# Patient Record
Sex: MALE | Race: WHITE | NOT HISPANIC OR LATINO | Employment: OTHER | URBAN - METROPOLITAN AREA
[De-identification: names, ages, dates, MRNs, and addresses within clinical notes are randomized per-mention and may not be internally consistent; named-entity substitution may affect disease eponyms.]

---

## 2019-06-24 ENCOUNTER — TELEPHONE (OUTPATIENT)
Dept: CARDIOLOGY | Facility: CLINIC | Age: 74
End: 2019-06-24

## 2019-06-24 NOTE — TELEPHONE ENCOUNTER
Patient called states is unable to tolerate Statin - severe leg and arm pain noted. He noted improvement when Pravastatin was stopped - Prvastatin 80 mg .

## 2019-11-05 ENCOUNTER — OFFICE VISIT (OUTPATIENT)
Dept: CARDIOLOGY | Facility: CLINIC | Age: 74
End: 2019-11-05

## 2019-11-05 VITALS
DIASTOLIC BLOOD PRESSURE: 76 MMHG | OXYGEN SATURATION: 98 % | SYSTOLIC BLOOD PRESSURE: 163 MMHG | BODY MASS INDEX: 33.59 KG/M2 | WEIGHT: 226.8 LBS | HEART RATE: 66 BPM | HEIGHT: 69 IN

## 2019-11-05 DIAGNOSIS — E78.5 DYSLIPIDEMIA: ICD-10-CM

## 2019-11-05 DIAGNOSIS — I10 ESSENTIAL HYPERTENSION: Primary | ICD-10-CM

## 2019-11-05 PROCEDURE — 99213 OFFICE O/P EST LOW 20 MIN: CPT | Performed by: INTERNAL MEDICINE

## 2019-11-05 PROCEDURE — 93000 ELECTROCARDIOGRAM COMPLETE: CPT | Performed by: INTERNAL MEDICINE

## 2019-11-05 RX ORDER — PRAVASTATIN SODIUM 80 MG/1
80 TABLET ORAL DAILY
COMMUNITY
End: 2020-04-13

## 2019-11-05 RX ORDER — GABAPENTIN 300 MG/1
300 CAPSULE ORAL 3 TIMES DAILY
Refills: 2 | COMMUNITY
Start: 2019-10-19 | End: 2021-07-01

## 2019-11-05 RX ORDER — INDOMETHACIN 25 MG/1
CAPSULE ORAL 2 TIMES DAILY PRN
COMMUNITY
Start: 2012-06-13

## 2019-11-05 RX ORDER — HYDRALAZINE HYDROCHLORIDE 25 MG/1
TABLET, FILM COATED ORAL 3 TIMES DAILY
COMMUNITY
Start: 2019-04-03 | End: 2020-04-13 | Stop reason: SDUPTHER

## 2019-11-05 RX ORDER — VITAMIN E 268 MG
CAPSULE ORAL DAILY
COMMUNITY
Start: 2015-08-19 | End: 2021-07-01

## 2019-11-05 RX ORDER — AMOXICILLIN 500 MG
CAPSULE ORAL
COMMUNITY
Start: 2012-06-13

## 2019-11-05 RX ORDER — CITALOPRAM 20 MG/1
20 TABLET ORAL DAILY
COMMUNITY
Start: 2016-08-16

## 2019-11-05 RX ORDER — MONTELUKAST SODIUM 10 MG/1
TABLET ORAL DAILY
COMMUNITY
Start: 2019-11-04 | End: 2021-07-01

## 2019-11-05 RX ORDER — HYDROCHLOROTHIAZIDE 12.5 MG/1
12.5 TABLET ORAL DAILY
COMMUNITY
End: 2020-04-13 | Stop reason: SDUPTHER

## 2019-11-05 RX ORDER — ALLOPURINOL 300 MG/1
300 TABLET ORAL DAILY
Refills: 4 | COMMUNITY
Start: 2019-08-12

## 2019-11-05 RX ORDER — MELOXICAM 15 MG/1
TABLET ORAL DAILY PRN
COMMUNITY
Start: 2013-01-22 | End: 2021-07-01

## 2019-11-05 RX ORDER — BIOTIN 5 MG
TABLET ORAL 2 TIMES DAILY
COMMUNITY
Start: 2012-06-13 | End: 2022-12-13

## 2019-11-05 RX ORDER — METOPROLOL TARTRATE 50 MG/1
TABLET, FILM COATED ORAL EVERY 12 HOURS
COMMUNITY
Start: 2017-12-16 | End: 2020-04-13 | Stop reason: SDUPTHER

## 2019-11-05 NOTE — PROGRESS NOTES
"History of Present Illness:  Followup 6 months   In hospital for pneumonia very ill - 2018        CC:  Recent admission in the hospital for pneumonia,  uncontrolled Hypertension, Dyslipidemia, COPD, statin intolerance     Mr. Xu Shook is a very pleasant  74 years old gentleman with history of hypertension and dyslipidemia. He had normal coronary arteries by cardiac catheterization in August of 2008.       .  His last lipid profile today showed total cholesterol 176  HDL 35 triglycerides 175.  He is currently on pravastatin 80 mg daily.  His lipids today showed total cholesterol of 172 triglycerides 180 HDL of 34 LDL of 102    /76   Pulse 66 Comment: Sinus  Ht 175.3 cm (69\")   Wt 103 kg (226 lb 12.8 oz)   SpO2 98%   BMI 33.49 kg/m²      Indication for EKG: Abnormal EKG, hypertension    Normal sinus rhythm with a rate of 66 bpm LVH with secondary repolarization abnormality similar to previous EKG of       EKG showed sinus bradycardia and LVH with secondary repolarization abnormalities siimilar to previous EKG 4/3/2019.  AL interval 150 ms QRS duration 99 ms  ms and QRS axis of 70.      We will see him in the office in 6 months for Follow-up.  He has myalgias secondary to pravastatin and it helped when he stopped it but he is resume taking it.  I have suggested that he cut down the pravastatin to 40 mg daily and watch his diet somewhat more strictly.  A/P     1   hypertension probably whitecoat since it is doing much better at home.  We will continue his hydralazine at 25 mg t.i.d. instead of b.i.d.     2- Dyslipidemia.  Lipid profile is as noted above.    Will see him in the office in 6 months for follow-up     .    Thank you very much for allowing us to participate in the patients                   Past Medical History:     Reviewed history from 08/24/2018 and no changes required:        Hypertension        Dyslipidemia        Normal Cardiac Cath, Aug. 2008        Sleep Apnea         Gout " "        \"Short of glaucoma\"         [facility]     Past Surgical History:     Reviewed history from 2018 and no changes required:        Cardiac Cath, Aug. 2008        Cholecystectomy        Knee Arthroscopy        Surgery Left arm : 2018     Active Medications (reviewed today):  ALBUTEROL SULFATE (2.5 MG/3ML) 0.083% INHALATION NEBULIZATION SOLUTION (ALBUTEROL SULFATE) SVN BID  PERCOCET TABLET (OXYCODONE-ACETAMINOPHEN TABS) PRN  CELEXA 20 MG ORAL TABLET (CITALOPRAM HYDROBROMIDE) Take 1 tablet by mouth daily  PRAVASTATIN 80MG    TAB (PRAVASTATIN SODIUM) TAKE 1/2 TABLET BY MOUTH ONCE DAILY  VITAMIN E 400 UNIT ORAL CAPSULE (VITAMIN E) Take 1 tablet by mouth daily  HYDROCHLOROTHIAZIDE 12.5MG CAP (HYDROCHLOROTHIAZIDE) TAKE ONE CAPSULE BY MOUTH ONCE DAILY  MELOXICAM 7.5 MG ORAL TABLET (MELOXICAM) Take 1 tablet by mouth daily prn  HYDRALAZINE 25MG    TAB (HYDRALAZINE HCL) TAKE 1 TABLET BY MOUTH THREE TIMES DAILY  METOPROLOL TART 50MG TAB (METOPROLOL TARTRATE) TAKE ONE TABLET BY MOUTH TWICE DAILY  INDOMETHACIN 25 MG ORAL CAPSULE (INDOMETHACIN) Take one (1) tablet by mouth twice a day prn  ALLOPURINOL 300 MG ORAL TABLET (ALLOPURINOL) Take 1 tablet by mouth daily  ASPIRIN 81 MG ORAL TABLET (ASPIRIN) Take 1 tablet by mouth daily  GLUCOSAMINE CHONDROITIN ADV ORAL TABLET (MISC NATURAL PRODUCTS) Take 1 tablet by mouth twice daily  FISH OIL TRIPLE STRENGTH 1400 MG ORAL CAPSULE (OMEGA-3 FATTY ACIDS) Take two (2) tablet by mouth twice a day     Current Allergies (reviewed today):  PENICILLIN (Severe)     Family History Summary:      Reviewed history Last on 4-3-19 and no changes required:      General Comments - FH:  FH Heart Disease-mother and maternal grandmother  of MI   FH Lung/Respiratory Disease-father   FH Hypertension-mother and brother   FH Diabetes-maternal grandmother         Social History:     Reviewed history from 4-3-19 and no changes required:                2 children and stepson        " Retired        Risk Factors:      Smoked Tobacco Use:  Never smoker  Passive smoke exposure:  no  Drug use:  no  HIV high-risk behavior:  no  Caffeine use:  5+ drinks per day  Alcohol use:  yes     Type:  beer 2 daily  Exercise:  yes     Times per week:  3     Type of Exercise:  yard work   Seatbelt use:  100 %  Sun Exposure:  frequently     Family History Risk Factors:     Family History of MI in females < 65 years old:  yes     Family History of MI in males < 55 years old:  no           Review of Systems      General       Denies fever, chills, sweats, anorexia, fatigue, weakness, malaise, weight loss, weight gain and sleep disorder.     CV       Denies difficulty breathing at night, near fainting, chest pain or discomfort, racing/skipping heart beats, fatigue, lightheadedness, shortness of breath with exertion, palpitations, swelling of hands or feet, difficulty breathing w   Neuro    Lungs no wheezing no shortness of breath.    Gastroenterology.  No constipation diarrhea nausea or vomiting.  No GI bleed.    Neurology negative.      Psych      No abnormalities.        Physical Exam     General:      well developed, well nourished, in no acute distress.    Neck:      no masses, thyromegaly, or abnormal cervical nodes.   no JVD. No carotid bruits  Lungs:      clear bilaterally to auscultation.    Heart:      non-displaced PMI, chest non-tender; regular rate and rhythm, S1, S2 without murmurs, rubs, or gallops    Abdomen soft nontender with no organomegaly masses abnormal pulsations or bruits pulses:      pulses normal in all 4 extremities.    Extremities:       no edema

## 2019-12-04 ENCOUNTER — ON CAMPUS - OUTPATIENT (OUTPATIENT)
Dept: URBAN - METROPOLITAN AREA HOSPITAL 77 | Facility: HOSPITAL | Age: 74
End: 2019-12-04
Payer: COMMERCIAL

## 2019-12-04 DIAGNOSIS — Z86.010 PERSONAL HISTORY OF COLONIC POLYPS: ICD-10-CM

## 2019-12-04 DIAGNOSIS — D12.2 BENIGN NEOPLASM OF ASCENDING COLON: ICD-10-CM

## 2019-12-04 DIAGNOSIS — D12.0 BENIGN NEOPLASM OF CECUM: ICD-10-CM

## 2019-12-04 PROCEDURE — 45385 COLONOSCOPY W/LESION REMOVAL: CPT | Performed by: INTERNAL MEDICINE

## 2020-04-13 ENCOUNTER — OFFICE VISIT (OUTPATIENT)
Dept: CARDIOLOGY | Facility: CLINIC | Age: 75
End: 2020-04-13

## 2020-04-13 VITALS
BODY MASS INDEX: 31.75 KG/M2 | SYSTOLIC BLOOD PRESSURE: 140 MMHG | DIASTOLIC BLOOD PRESSURE: 60 MMHG | WEIGHT: 215 LBS | HEART RATE: 60 BPM

## 2020-04-13 DIAGNOSIS — I10 ESSENTIAL HYPERTENSION: Primary | ICD-10-CM

## 2020-04-13 DIAGNOSIS — J43.9 PULMONARY EMPHYSEMA, UNSPECIFIED EMPHYSEMA TYPE (HCC): ICD-10-CM

## 2020-04-13 DIAGNOSIS — E78.2 MIXED HYPERLIPIDEMIA: ICD-10-CM

## 2020-04-13 PROBLEM — J44.9 COPD (CHRONIC OBSTRUCTIVE PULMONARY DISEASE): Status: ACTIVE | Noted: 2020-04-13

## 2020-04-13 PROBLEM — E78.5 HYPERLIPIDEMIA: Status: ACTIVE | Noted: 2020-04-13

## 2020-04-13 PROCEDURE — 99443 PR PHYS/QHP TELEPHONE EVALUATION 21-30 MIN: CPT | Performed by: INTERNAL MEDICINE

## 2020-04-13 RX ORDER — HYDRALAZINE HYDROCHLORIDE 25 MG/1
25 TABLET, FILM COATED ORAL 3 TIMES DAILY
Qty: 270 TABLET | Refills: 3 | Status: SHIPPED | OUTPATIENT
Start: 2020-04-13 | End: 2020-05-11 | Stop reason: SDUPTHER

## 2020-04-13 RX ORDER — PRAVASTATIN SODIUM 40 MG
40 TABLET ORAL DAILY
Qty: 90 TABLET | Refills: 3 | Status: SHIPPED | OUTPATIENT
Start: 2020-04-13 | End: 2020-07-14

## 2020-04-13 RX ORDER — HYDROCHLOROTHIAZIDE 12.5 MG/1
12.5 TABLET ORAL DAILY
Qty: 90 TABLET | Refills: 3 | Status: SHIPPED | OUTPATIENT
Start: 2020-04-13 | End: 2020-07-14 | Stop reason: SDUPTHER

## 2020-04-13 RX ORDER — METOPROLOL TARTRATE 50 MG/1
50 TABLET, FILM COATED ORAL 2 TIMES DAILY
Qty: 180 TABLET | Refills: 3 | Status: SHIPPED | OUTPATIENT
Start: 2020-04-13 | End: 2020-07-14 | Stop reason: SDUPTHER

## 2020-04-13 NOTE — PROGRESS NOTES
Cardiology Office Visit      Encounter Date:  04/13/2020    Patient ID:   Xu Baeza is a 74 y.o. male.    Reason For Followup:  Hypertension  Hyperlipidemia  Coronary artery disease  Statin intolerance    Brief Clinical History:  Dear Dr. Bonilla, London Broderick MD    I had the pleasure of seeing Xu Baeza today. As you are well aware, this is a 74 y.o. male with history of hypertension and dyslipidemia. He had normal coronary arteries by cardiac catheterization in August of 2008.      Interval History:  Denies any symptoms of chest pain shortness of breath dizziness or syncope    Assessment & Plan    Impressions:  1   hypertension probably whitecoat since it is doing much better at home.  We will continue his hydralazine at 25 mg t.i.d. medications reviewed with the patient.     2- Dyslipidemia.  Lipid profile is as noted above.    3.  Coronary artery disease    4.  Intolerance to statins    Recommendations:  We will see him in the office in 6 months for Follow-up.  He has myalgias secondary to pravastatin and it helped when he stopped it but he is resume taking it.  I have suggested that he cut down the pravastatin to 40 mg daily and watch his diet somewhat more strictly.  Labs discussed with the patient  Regular labs with primary care physician  Will check lipid levels in 3 months on Pravachol 40 mg p.o. once a day and decide on the further course of action  Patient is tolerating 40 mg of Pravachol without any significant myalgias  Spent 25 minutes with this telephone visit  Thank you very much for allowing us to participate in the patients  Objective:    Vitals:  Vitals:    04/13/20 0934   BP: 140/60   Pulse: 60   Weight: 97.5 kg (215 lb)         Allergies:  Allergies   Allergen Reactions   • Penicillins Other (See Comments)     Passed out         Medication Review:     Current Outpatient Medications:   •  allopurinol (ZYLOPRIM) 300 MG tablet, Take 300 mg by mouth Daily., Disp: , Rfl: 4  •  aspirin 81  MG tablet, Daily., Disp: , Rfl:   •  Cetirizine HCl (ZYRTEC ALLERGY) 10 MG capsule, Daily As Needed., Disp: , Rfl:   •  citalopram (CeleXA) 20 MG tablet, Take 20 mg by mouth Daily., Disp: , Rfl:   •  gabapentin (NEURONTIN) 300 MG capsule, Take 300 mg by mouth 3 (Three) Times a Day., Disp: , Rfl: 2  •  hydrALAZINE (APRESOLINE) 25 MG tablet, Take 1 tablet by mouth 3 (Three) Times a Day., Disp: 270 tablet, Rfl: 3  •  hydroCHLOROthiazide (HYDRODIURIL) 12.5 MG tablet, Take 1 tablet by mouth Daily., Disp: 90 tablet, Rfl: 3  •  indomethacin (INDOCIN) 25 MG capsule, 2 (Two) Times a Day As Needed., Disp: , Rfl:   •  meloxicam (MOBIC) 15 MG tablet, Daily As Needed., Disp: , Rfl:   •  metoprolol tartrate (LOPRESSOR) 50 MG tablet, Take 1 tablet by mouth 2 (Two) Times a Day., Disp: 180 tablet, Rfl: 3  •  Misc Natural Products (GLUCOSAMINE CHONDROITIN ADV) tablet, 2 (Two) Times a Day., Disp: , Rfl:   •  montelukast (SINGULAIR) 10 MG tablet, Daily., Disp: , Rfl:   •  Omega-3 Fatty Acids (FISH OIL) 1200 MG capsule capsule, 2 tablets twice daily, Disp: , Rfl:   •  vitamin E 400 UNIT capsule, Daily., Disp: , Rfl:   •  pravastatin (Pravachol) 40 MG tablet, Take 1 tablet by mouth Daily., Disp: 90 tablet, Rfl: 3    Family History:  Family History   Problem Relation Age of Onset   • Heart disease Mother    • Hypertension Mother    • Hypertension Brother    • Heart attack Maternal Grandmother    • Diabetes Maternal Grandmother        Past Medical History:  Past Medical History:   Diagnosis Date   • COPD (chronic obstructive pulmonary disease) (CMS/HCC)    • Gout    • Hyperlipidemia    • Hypertension    • Sleep apnea        Past surgical History:  Past Surgical History:   Procedure Laterality Date   • CARDIAC CATHETERIZATION  08/2008    normal coronaries    • CHOLECYSTECTOMY     • KNEE ARTHROSCOPY     • OTHER SURGICAL HISTORY Left 08/2018     arm surgery        Social History:  Social History     Socioeconomic History   • Marital status:       Spouse name: Not on file   • Number of children: Not on file   • Years of education: Not on file   • Highest education level: Not on file   Tobacco Use   • Smoking status: Never Smoker   • Smokeless tobacco: Never Used   Substance and Sexual Activity   • Alcohol use: Yes     Alcohol/week: 1.0 - 2.0 standard drinks     Types: 1 - 2 Cans of beer per week   • Drug use: No       Review of Systems:  The following systems were reviewed as they relate to the cardiovascular system: Constitutional, Eyes, ENT, Cardiovascular, Respiratory, Gastrointestinal, Integumentary, Neurological, Psychiatric, Hematologic, Endocrine, Musculoskeletal, and Genitourinary. The pertinent cardiovascular findings are reported above with all other cardiovascular points within those systems being negative.    Diagnostic Study Review:     Current Electrocardiogram:  Procedures      NOTE: The following portions of the patient's history were reviewed and updated this visit as appropriate: allergies, current medications, past family history, past medical history, past social history, past surgical history and problem list.

## 2020-05-11 RX ORDER — HYDRALAZINE HYDROCHLORIDE 25 MG/1
25 TABLET, FILM COATED ORAL 3 TIMES DAILY
Qty: 270 TABLET | Refills: 3 | Status: SHIPPED | OUTPATIENT
Start: 2020-05-11 | End: 2020-07-14 | Stop reason: SDUPTHER

## 2020-05-11 NOTE — TELEPHONE ENCOUNTER
Pt called requesting refill on his hydralazine 25 mg.  I sent refill to the [facility] pharmacy at pt request.

## 2020-07-14 ENCOUNTER — OFFICE VISIT (OUTPATIENT)
Dept: CARDIOLOGY | Facility: CLINIC | Age: 75
End: 2020-07-14

## 2020-07-14 VITALS
WEIGHT: 228 LBS | OXYGEN SATURATION: 97 % | RESPIRATION RATE: 18 BRPM | HEIGHT: 69 IN | DIASTOLIC BLOOD PRESSURE: 77 MMHG | HEART RATE: 58 BPM | SYSTOLIC BLOOD PRESSURE: 147 MMHG | BODY MASS INDEX: 33.77 KG/M2

## 2020-07-14 DIAGNOSIS — I10 ESSENTIAL HYPERTENSION: ICD-10-CM

## 2020-07-14 DIAGNOSIS — J43.9 PULMONARY EMPHYSEMA, UNSPECIFIED EMPHYSEMA TYPE (HCC): ICD-10-CM

## 2020-07-14 DIAGNOSIS — E78.2 MIXED HYPERLIPIDEMIA: Primary | ICD-10-CM

## 2020-07-14 DIAGNOSIS — G47.33 OBSTRUCTIVE SLEEP APNEA SYNDROME: ICD-10-CM

## 2020-07-14 PROBLEM — G47.30 SLEEP APNEA: Status: ACTIVE | Noted: 2020-07-14

## 2020-07-14 PROCEDURE — 99214 OFFICE O/P EST MOD 30 MIN: CPT | Performed by: INTERNAL MEDICINE

## 2020-07-14 PROCEDURE — 93000 ELECTROCARDIOGRAM COMPLETE: CPT | Performed by: INTERNAL MEDICINE

## 2020-07-14 RX ORDER — EZETIMIBE 10 MG/1
10 TABLET ORAL DAILY
Qty: 90 TABLET | Refills: 3 | Status: SHIPPED | OUTPATIENT
Start: 2020-07-14 | End: 2021-07-01 | Stop reason: SDUPTHER

## 2020-07-14 RX ORDER — HYDROCHLOROTHIAZIDE 12.5 MG/1
12.5 TABLET ORAL DAILY
Qty: 90 TABLET | Refills: 3 | Status: SHIPPED | OUTPATIENT
Start: 2020-07-14 | End: 2021-06-03 | Stop reason: SDUPTHER

## 2020-07-14 RX ORDER — METOPROLOL TARTRATE 50 MG/1
50 TABLET, FILM COATED ORAL 2 TIMES DAILY
Qty: 180 TABLET | Refills: 3 | Status: SHIPPED | OUTPATIENT
Start: 2020-07-14 | End: 2021-07-01

## 2020-07-14 RX ORDER — HYDRALAZINE HYDROCHLORIDE 25 MG/1
25 TABLET, FILM COATED ORAL 3 TIMES DAILY
Qty: 270 TABLET | Refills: 3 | Status: SHIPPED | OUTPATIENT
Start: 2020-07-14 | End: 2021-07-01 | Stop reason: SDUPTHER

## 2020-07-14 NOTE — PROGRESS NOTES
"Cardiology Office Visit      Encounter Date:  07/14/2020    Patient ID:   Xu Baeza is a 74 y.o. male.    Reason For Followup:  Hypertension  Hyperlipidemia  Coronary artery disease  Statin intolerance    Brief Clinical History:  Dear Dr. Bonilla, London Broderick MD    I had the pleasure of seeing Xu Baeza today. As you are well aware, this is a 74 y.o. male with history of hypertension and dyslipidemia. He had normal coronary arteries by cardiac catheterization in August of 2008.      Interval History:  Denies any symptoms of chest pain shortness of breath dizziness or syncope  Patient is complaining of significant myalgias and inability to take statins  Was tried on different statins and multiple dosing patterns  Continue taking only fish oil    Assessment & Plan    Impressions:  1   hypertension probably whitecoat since it is doing much better at home.  We will continue his hydralazine at 25 mg t.i.d. medications reviewed with the patient.  Continue current dose of metoprolol and hydrochlorothiazide     2- Dyslipidemia.  Lipid profile is as noted above.  start patient on Zetia 10 mg p.o. once a day and recheck lipids in 2 months    3.  Coronary artery disease    4.  Intolerance to statins    Recommendations:    He has myalgias secondary to pravastatin  Patient tried multiple statins and multiple dosing patterns and does not want to take statins anymore  Start him on Zetia 10 mg p.o. once a day  Recheck lipids in 2 months  Regular labs with primary care physician  Continue regular exercise and risk factor modification  Continue fish oil supplements  We will see him in the office in 6 months for Follow-up.     Thank you very much for allowing us to participate in the patients    Objective:    Vitals:  Vitals:    07/14/20 0844   BP: 147/77   BP Location: Left arm   Pulse: 58   Resp: 18   SpO2: 97%   Weight: 103 kg (228 lb)   Height: 175.3 cm (69\")       Physical Exam:    General: Alert, cooperative, no " distress, appears stated age  Head:  Normocephalic, atraumatic, mucous membranes moist  Eyes:  Conjunctiva/corneas clear, EOM's intact     Neck:  Supple,  no adenopathy;      Lungs: Clear to auscultation bilaterally, no wheezes rhonchi rales are noted  Chest wall: No tenderness  Heart::  Regular rate and rhythm, S1 and S2 normal, no murmur, rub or gallop  Abdomen: Soft, non-tender, nondistended bowel sounds active  Extremities: No cyanosis, clubbing, or edema  Pulses: 2+ and symmetric all extremities  Skin:  No rashes or lesions  Neuro/psych: A&O x3. CN II through XII are grossly intact with appropriate affect      Allergies:  Allergies   Allergen Reactions   • Penicillins Other (See Comments)     Passed out     • Pravastatin Myalgia       Medication Review:     Current Outpatient Medications:   •  allopurinol (ZYLOPRIM) 300 MG tablet, Take 300 mg by mouth Daily., Disp: , Rfl: 4  •  aspirin 81 MG tablet, Daily., Disp: , Rfl:   •  citalopram (CeleXA) 20 MG tablet, Take 20 mg by mouth Daily., Disp: , Rfl:   •  gabapentin (NEURONTIN) 300 MG capsule, Take 300 mg by mouth 3 (Three) Times a Day., Disp: , Rfl: 2  •  hydrALAZINE (APRESOLINE) 25 MG tablet, Take 1 tablet by mouth 3 (Three) Times a Day., Disp: 270 tablet, Rfl: 3  •  hydroCHLOROthiazide (HYDRODIURIL) 12.5 MG tablet, Take 1 tablet by mouth Daily., Disp: 90 tablet, Rfl: 3  •  metoprolol tartrate (LOPRESSOR) 50 MG tablet, Take 1 tablet by mouth 2 (Two) Times a Day., Disp: 180 tablet, Rfl: 3  •  Misc Natural Products (GLUCOSAMINE CHONDROITIN ADV) tablet, 2 (Two) Times a Day., Disp: , Rfl:   •  montelukast (SINGULAIR) 10 MG tablet, Daily., Disp: , Rfl:   •  Omega-3 Fatty Acids (FISH OIL) 1200 MG capsule capsule, 2 tablets twice daily, Disp: , Rfl:   •  vitamin E 400 UNIT capsule, Daily., Disp: , Rfl:   •  indomethacin (INDOCIN) 25 MG capsule, 2 (Two) Times a Day As Needed., Disp: , Rfl:   •  meloxicam (MOBIC) 15 MG tablet, Daily As Needed., Disp: , Rfl:     Family  History:  Family History   Problem Relation Age of Onset   • Heart disease Mother    • Hypertension Mother    • Hypertension Brother    • Heart attack Maternal Grandmother    • Diabetes Maternal Grandmother        Past Medical History:  Past Medical History:   Diagnosis Date   • COPD (chronic obstructive pulmonary disease) (CMS/HCC)    • Gout    • Hyperlipidemia    • Hypertension    • Sleep apnea        Past surgical History:  Past Surgical History:   Procedure Laterality Date   • CARDIAC CATHETERIZATION  08/2008    normal coronaries    • CHOLECYSTECTOMY     • KNEE ARTHROSCOPY     • OTHER SURGICAL HISTORY Left 08/2018     arm surgery        Social History:  Social History     Socioeconomic History   • Marital status:      Spouse name: Not on file   • Number of children: Not on file   • Years of education: Not on file   • Highest education level: Not on file   Tobacco Use   • Smoking status: Never Smoker   • Smokeless tobacco: Never Used   Substance and Sexual Activity   • Alcohol use: Yes     Alcohol/week: 1.0 - 2.0 standard drinks     Types: 1 - 2 Cans of beer per week   • Drug use: No       Review of Systems:  The following systems were reviewed as they relate to the cardiovascular system: Constitutional, Eyes, ENT, Cardiovascular, Respiratory, Gastrointestinal, Integumentary, Neurological, Psychiatric, Hematologic, Endocrine, Musculoskeletal, and Genitourinary. The pertinent cardiovascular findings are reported above with all other cardiovascular points within those systems being negative.    Diagnostic Study Review:     Current Electrocardiogram:    ECG 12 Lead  Date/Time: 7/14/2020 9:03 AM  Performed by: Eva Irwin MD  Authorized by: Eva Irwin MD   Comparison: compared with previous ECG   Similar to previous ECG  Rhythm: sinus rhythm  Rate: normal  BPM: 58  Conduction: conduction normal  QRS axis: normal  Other findings: non-specific ST-T wave changes    Clinical impression:  abnormal EKG              NOTE: The following portions of the patient's history were reviewed and updated this visit as appropriate: allergies, current medications, past family history, past medical history, past social history, past surgical history and problem list.

## 2021-01-19 ENCOUNTER — TELEPHONE (OUTPATIENT)
Dept: CARDIOLOGY | Facility: CLINIC | Age: 76
End: 2021-01-19

## 2021-01-19 NOTE — TELEPHONE ENCOUNTER
Attempted to call the Pt to find out if he has had labs done. Both numbers we have on file are incorrect.

## 2021-05-24 RX ORDER — PRAVASTATIN SODIUM 40 MG
TABLET ORAL
Qty: 90 TABLET | Refills: 0 | OUTPATIENT
Start: 2021-05-24

## 2021-06-03 RX ORDER — HYDROCHLOROTHIAZIDE 12.5 MG/1
12.5 TABLET ORAL DAILY
Qty: 30 TABLET | Refills: 0 | Status: SHIPPED | OUTPATIENT
Start: 2021-06-03 | End: 2021-07-01 | Stop reason: SDUPTHER

## 2021-07-01 ENCOUNTER — OFFICE VISIT (OUTPATIENT)
Dept: CARDIOLOGY | Facility: CLINIC | Age: 76
End: 2021-07-01

## 2021-07-01 VITALS
DIASTOLIC BLOOD PRESSURE: 70 MMHG | BODY MASS INDEX: 33.97 KG/M2 | HEART RATE: 67 BPM | OXYGEN SATURATION: 98 % | SYSTOLIC BLOOD PRESSURE: 178 MMHG | WEIGHT: 230 LBS

## 2021-07-01 DIAGNOSIS — E78.5 DYSLIPIDEMIA: ICD-10-CM

## 2021-07-01 DIAGNOSIS — G47.33 OBSTRUCTIVE SLEEP APNEA SYNDROME: ICD-10-CM

## 2021-07-01 DIAGNOSIS — E78.2 MIXED HYPERLIPIDEMIA: Primary | ICD-10-CM

## 2021-07-01 DIAGNOSIS — I10 ESSENTIAL HYPERTENSION: ICD-10-CM

## 2021-07-01 PROCEDURE — 99214 OFFICE O/P EST MOD 30 MIN: CPT | Performed by: INTERNAL MEDICINE

## 2021-07-01 PROCEDURE — 93000 ELECTROCARDIOGRAM COMPLETE: CPT | Performed by: INTERNAL MEDICINE

## 2021-07-01 RX ORDER — HYDRALAZINE HYDROCHLORIDE 25 MG/1
25 TABLET, FILM COATED ORAL 3 TIMES DAILY
Qty: 270 TABLET | Refills: 3 | Status: SHIPPED | OUTPATIENT
Start: 2021-07-01 | End: 2021-12-14 | Stop reason: SDUPTHER

## 2021-07-01 RX ORDER — AMLODIPINE BESYLATE 5 MG/1
5 TABLET ORAL DAILY
Qty: 90 TABLET | Refills: 3 | Status: SHIPPED | OUTPATIENT
Start: 2021-07-01 | End: 2021-12-14 | Stop reason: SDUPTHER

## 2021-07-01 RX ORDER — HYDROCHLOROTHIAZIDE 12.5 MG/1
12.5 TABLET ORAL DAILY
Qty: 90 TABLET | Refills: 3 | Status: SHIPPED | OUTPATIENT
Start: 2021-07-01 | End: 2021-09-07

## 2021-07-01 RX ORDER — EZETIMIBE 10 MG/1
10 TABLET ORAL DAILY
Qty: 90 TABLET | Refills: 3 | Status: SHIPPED | OUTPATIENT
Start: 2021-07-01 | End: 2021-07-02 | Stop reason: SDUPTHER

## 2021-07-01 NOTE — PROGRESS NOTES
Cardiology Office Visit      Encounter Date:  07/01/2021    Patient ID:   Xu Baeza is a 75 y.o. male.    Reason For Followup:  Hypertension  Hyperlipidemia  Coronary artery disease  Statin intolerance    Brief Clinical History:  Dear Dr. Bonilla, London Broderick MD    I had the pleasure of seeing Xu Baeza today. As you are well aware, this is a 75 y.o. male with history of hypertension and dyslipidemia. He had normal coronary arteries by cardiac catheterization in August of 2008.      Interval History:  Denies any symptoms of chest pain shortness of breath dizziness or syncope      Assessment & Plan    Impressions:  1   hypertension probably whitecoat since it is doing much better at home.  We will continue his hydralazine at 25 mg t.i.d. medications reviewed with the patient.  Continue current dose of metoprolol and hydrochlorothiazide     2- Dyslipidemia.  Lipid profile is as noted above.  start patient on Zetia 10 mg p.o. once a day and recheck lipids in 2 months    3.  Coronary artery disease    4.  Intolerance to statins    Recommendations:    He has myalgias secondary to pravastatin  Patient tried multiple statins and multiple dosing patterns and does not want to take statins anymore  Patient was not started on Zetia after last visit so patient is going to be advised to start on Zetia 10 mg p.o. once a day  He is also discontinued metoprolol secondary to significant fatigue so patient is going to start on Norvasc 5 mg p.o. once a day  Continue hydralazine and hydrochlorothiazide for blood pressure control  Recent vascular screening test that was normal per patient  Check labs including CBC CMP and lipids  Continue regular exercise and risk factor modification  Continue fish oil supplements  We will see him in the office in 6 months for Follow-up.     Thank you very much for allowing us to participate in the patients    Objective:    Vitals:  Vitals:    07/01/21 0810   BP: 178/70   Pulse: 67   SpO2:  98%   Weight: 104 kg (230 lb)       Physical Exam:    General: Alert, cooperative, no distress, appears stated age  Head:  Normocephalic, atraumatic, mucous membranes moist  Eyes:  Conjunctiva/corneas clear, EOM's intact     Neck:  Supple,  no adenopathy;      Lungs: Clear to auscultation bilaterally, no wheezes rhonchi rales are noted  Chest wall: No tenderness  Heart::  Regular rate and rhythm, S1 and S2 normal, no murmur, rub or gallop  Abdomen: Soft, non-tender, nondistended bowel sounds active  Extremities: No cyanosis, clubbing, or edema  Pulses: 2+ and symmetric all extremities  Skin:  No rashes or lesions  Neuro/psych: A&O x3. CN II through XII are grossly intact with appropriate affect      Allergies:  Allergies   Allergen Reactions   • Penicillins Other (See Comments)     Passed out     • Pravastatin Myalgia       Medication Review:     Current Outpatient Medications:   •  allopurinol (ZYLOPRIM) 300 MG tablet, Take 300 mg by mouth Daily., Disp: , Rfl: 4  •  aspirin 81 MG tablet, Daily., Disp: , Rfl:   •  citalopram (CeleXA) 20 MG tablet, Take 20 mg by mouth Daily., Disp: , Rfl:   •  Cod Liver Oil 1000 MG capsule, Take  by mouth., Disp: , Rfl:   •  ezetimibe (ZETIA) 10 MG tablet, Take 1 tablet by mouth Daily., Disp: 90 tablet, Rfl: 3  •  gabapentin (NEURONTIN) 300 MG capsule, Take 300 mg by mouth 3 (Three) Times a Day., Disp: , Rfl: 2  •  hydrALAZINE (APRESOLINE) 25 MG tablet, Take 1 tablet by mouth 3 (Three) Times a Day., Disp: 270 tablet, Rfl: 3  •  hydroCHLOROthiazide (HYDRODIURIL) 12.5 MG tablet, Take 1 tablet by mouth Daily., Disp: 30 tablet, Rfl: 0  •  indomethacin (INDOCIN) 25 MG capsule, 2 (Two) Times a Day As Needed., Disp: , Rfl:   •  meloxicam (MOBIC) 15 MG tablet, Daily As Needed., Disp: , Rfl:   •  Misc Natural Products (GLUCOSAMINE CHONDROITIN ADV) tablet, 2 (Two) Times a Day., Disp: , Rfl:   •  montelukast (SINGULAIR) 10 MG tablet, Daily., Disp: , Rfl:   •  Omega-3 Fatty Acids (FISH OIL) 1200  MG capsule capsule, 2 tablets twice daily, Disp: , Rfl:   •  metoprolol tartrate (LOPRESSOR) 50 MG tablet, Take 1 tablet by mouth 2 (Two) Times a Day., Disp: 180 tablet, Rfl: 3  •  vitamin E 400 UNIT capsule, Daily., Disp: , Rfl:     Family History:  Family History   Problem Relation Age of Onset   • Heart disease Mother    • Hypertension Mother    • Hypertension Brother    • Heart attack Maternal Grandmother    • Diabetes Maternal Grandmother        Past Medical History:  Past Medical History:   Diagnosis Date   • COPD (chronic obstructive pulmonary disease) (CMS/[facility])    • Gout    • Hyperlipidemia    • Hypertension    • Sleep apnea        Past surgical History:  Past Surgical History:   Procedure Laterality Date   • CARDIAC CATHETERIZATION  08/2008    normal coronaries    • CHOLECYSTECTOMY     • KNEE ARTHROSCOPY     • OTHER SURGICAL HISTORY Left 08/2018     arm surgery        Social History:  Social History     Socioeconomic History   • Marital status:      Spouse name: Not on file   • Number of children: Not on file   • Years of education: Not on file   • Highest education level: Not on file   Tobacco Use   • Smoking status: Never Smoker   • Smokeless tobacco: Never Used   Vaping Use   • Vaping Use: Never used   Substance and Sexual Activity   • Alcohol use: Yes     Alcohol/week: 1.0 - 2.0 standard drinks     Types: 1 - 2 Cans of beer per week   • Drug use: No       Review of Systems:  The following systems were reviewed as they relate to the cardiovascular system: Constitutional, Eyes, ENT, Cardiovascular, Respiratory, Gastrointestinal, Integumentary, Neurological, Psychiatric, Hematologic, Endocrine, Musculoskeletal, and Genitourinary. The pertinent cardiovascular findings are reported above with all other cardiovascular points within those systems being negative.    Diagnostic Study Review:     Current Electrocardiogram:    ECG 12 Lead    Date/Time: 7/1/2021 8:43 AM  Performed by: Eva Irwin,  MD  Authorized by: Eva Irwin MD   Comparison: compared with previous ECG   Similar to previous ECG  Rhythm: sinus rhythm  Rate: normal  BPM: 67  Conduction: conduction normal  QRS axis: normal  Other findings: non-specific ST-T wave changes    Clinical impression: abnormal EKG              NOTE: The following portions of the patient's history were reviewed and updated this visit as appropriate: allergies, current medications, past family history, past medical history, past social history, past surgical history and problem list.

## 2021-07-02 RX ORDER — EZETIMIBE 10 MG/1
10 TABLET ORAL DAILY
Qty: 90 TABLET | Refills: 2 | Status: SHIPPED | OUTPATIENT
Start: 2021-07-02 | End: 2021-09-07

## 2021-09-07 RX ORDER — EZETIMIBE 10 MG/1
TABLET ORAL
Qty: 90 TABLET | Refills: 0 | Status: SHIPPED | OUTPATIENT
Start: 2021-09-07 | End: 2021-12-14

## 2021-09-07 RX ORDER — HYDROCHLOROTHIAZIDE 12.5 MG/1
TABLET ORAL
Qty: 90 TABLET | Refills: 0 | Status: SHIPPED | OUTPATIENT
Start: 2021-09-07 | End: 2021-12-14

## 2021-12-14 ENCOUNTER — OFFICE VISIT (OUTPATIENT)
Dept: CARDIOLOGY | Facility: CLINIC | Age: 76
End: 2021-12-14

## 2021-12-14 VITALS
RESPIRATION RATE: 18 BRPM | DIASTOLIC BLOOD PRESSURE: 70 MMHG | HEART RATE: 67 BPM | WEIGHT: 227 LBS | SYSTOLIC BLOOD PRESSURE: 159 MMHG | HEIGHT: 69 IN | BODY MASS INDEX: 33.62 KG/M2 | OXYGEN SATURATION: 98 %

## 2021-12-14 DIAGNOSIS — E78.5 DYSLIPIDEMIA: Primary | ICD-10-CM

## 2021-12-14 DIAGNOSIS — J43.9 PULMONARY EMPHYSEMA, UNSPECIFIED EMPHYSEMA TYPE (HCC): ICD-10-CM

## 2021-12-14 DIAGNOSIS — E78.2 MIXED HYPERLIPIDEMIA: ICD-10-CM

## 2021-12-14 DIAGNOSIS — I10 PRIMARY HYPERTENSION: ICD-10-CM

## 2021-12-14 PROCEDURE — 99214 OFFICE O/P EST MOD 30 MIN: CPT | Performed by: INTERNAL MEDICINE

## 2021-12-14 RX ORDER — AMLODIPINE BESYLATE 5 MG/1
5 TABLET ORAL DAILY
Qty: 90 TABLET | Refills: 3 | Status: SHIPPED | OUTPATIENT
Start: 2021-12-14 | End: 2022-12-13 | Stop reason: SDUPTHER

## 2021-12-14 RX ORDER — EZETIMIBE 10 MG/1
TABLET ORAL
Qty: 90 TABLET | Refills: 0 | Status: SHIPPED | OUTPATIENT
Start: 2021-12-14 | End: 2021-12-14 | Stop reason: SDUPTHER

## 2021-12-14 RX ORDER — EZETIMIBE 10 MG/1
10 TABLET ORAL DAILY
Qty: 90 TABLET | Refills: 3 | Status: SHIPPED | OUTPATIENT
Start: 2021-12-14 | End: 2022-04-13

## 2021-12-14 RX ORDER — HYDROCHLOROTHIAZIDE 12.5 MG/1
12.5 TABLET ORAL DAILY
Qty: 90 TABLET | Refills: 3 | Status: SHIPPED | OUTPATIENT
Start: 2021-12-14 | End: 2022-04-13

## 2021-12-14 RX ORDER — HYDROCHLOROTHIAZIDE 12.5 MG/1
TABLET ORAL
Qty: 90 TABLET | Refills: 0 | Status: SHIPPED | OUTPATIENT
Start: 2021-12-14 | End: 2021-12-14 | Stop reason: SDUPTHER

## 2021-12-14 RX ORDER — HYDRALAZINE HYDROCHLORIDE 25 MG/1
25 TABLET, FILM COATED ORAL 3 TIMES DAILY
Qty: 270 TABLET | Refills: 3 | Status: SHIPPED | OUTPATIENT
Start: 2021-12-14 | End: 2022-12-13

## 2021-12-14 NOTE — PROGRESS NOTES
Cardiology Office Visit      Encounter Date:  12/14/2021    Patient ID:   Xu Baeza is a 76 y.o. male.      Reason For Followup:  Hypertension  Hyperlipidemia  Coronary artery disease  Statin intolerance    Brief Clinical History:  Dear Dr. Bonilla, London Broderick MD    I had the pleasure of seeing Xu Baeza today. As you are well aware, this is a 76 y.o. male with history of hypertension and dyslipidemia. He had normal coronary arteries by cardiac catheterization in August of 2008.      Interval History:  Denies any symptoms of chest pain shortness of breath dizziness or syncope  Denies any cardiac symptoms  Denies any dizziness or syncope  No orthopnea no PND    Assessment & Plan    Impressions:  1   hypertension probably whitecoat since it is doing much better at home.  We will continue his hydralazine at 25 mg t.i.d. medications reviewed with the patient.  Continue current dose of metoprolol and hydrochlorothiazide     2- Dyslipidemia.  Lipid profile is as noted above.  start patient on Zetia 10 mg p.o. once a day and recheck lipids in 2 months    3.  Coronary artery disease    4.  Intolerance to statins    Recommendations:    He has myalgias secondary to pravastatin  Patient tried multiple statins and multiple dosing patterns and does not want to take statins anymore  Continue Zetia 10 mg p.o. once a day  Continue Norvasc 5 mg p.o. once a day  Continue hydralazine and hydrochlorothiazide for blood pressure control  Recent vascular screening test that was normal per patient  Recent labs reviewed and discussed with patient  Continue regular exercise and risk factor modification  Continue fish oil supplements  We will see him in the office in 6 months for Follow-up.     Thank you very much for allowing us to participate in the patients  Objective:    Vitals:  Vitals:    12/14/21 1519   BP: 159/70   BP Location: Left arm   Patient Position: Sitting   Cuff Size: Large Adult   Pulse: 67   Resp: 18   SpO2:  "98%   Weight: 103 kg (227 lb)   Height: 175.3 cm (69\")       Physical Exam:    General: Alert, cooperative, no distress, appears stated age  Head:  Normocephalic, atraumatic, mucous membranes moist  Eyes:  Conjunctiva/corneas clear, EOM's intact     Neck:  Supple,  no adenopathy;      Lungs: Clear to auscultation bilaterally, no wheezes rhonchi rales are noted  Chest wall: No tenderness  Heart::  Regular rate and rhythm, S1 and S2 normal, no murmur, rub or gallop  Abdomen: Soft, non-tender, nondistended bowel sounds active  Extremities: No cyanosis, clubbing, or edema  Pulses: 2+ and symmetric all extremities  Skin:  No rashes or lesions  Neuro/psych: A&O x3. CN II through XII are grossly intact with appropriate affect      Allergies:  Allergies   Allergen Reactions   • Penicillins Other (See Comments)     Passed out     • Pravastatin Myalgia       Medication Review:     Current Outpatient Medications:   •  allopurinol (ZYLOPRIM) 300 MG tablet, Take 300 mg by mouth Daily., Disp: , Rfl: 4  •  amLODIPine (NORVASC) 5 MG tablet, Take 1 tablet by mouth Daily., Disp: 90 tablet, Rfl: 3  •  aspirin 81 MG tablet, Daily., Disp: , Rfl:   •  citalopram (CeleXA) 20 MG tablet, Take 20 mg by mouth Daily., Disp: , Rfl:   •  Cod Liver Oil 1000 MG capsule, Take  by mouth., Disp: , Rfl:   •  ezetimibe (ZETIA) 10 MG tablet, Take 1 tablet by mouth Daily., Disp: 90 tablet, Rfl: 3  •  hydrALAZINE (APRESOLINE) 25 MG tablet, Take 1 tablet by mouth 3 (Three) Times a Day., Disp: 270 tablet, Rfl: 3  •  hydroCHLOROthiazide (HYDRODIURIL) 12.5 MG tablet, Take 1 tablet by mouth Daily., Disp: 90 tablet, Rfl: 3  •  indomethacin (INDOCIN) 25 MG capsule, 2 (Two) Times a Day As Needed., Disp: , Rfl:   •  Misc Natural Products (GLUCOSAMINE CHONDROITIN ADV) tablet, 2 (Two) Times a Day., Disp: , Rfl:   •  Omega-3 Fatty Acids (FISH OIL) 1200 MG capsule capsule, 2 tablets twice daily, Disp: , Rfl:     Family History:  Family History   Problem Relation Age " of Onset   • Heart disease Mother    • Hypertension Mother    • Hypertension Brother    • Heart attack Maternal Grandmother    • Diabetes Maternal Grandmother        Past Medical History:  Past Medical History:   Diagnosis Date   • COPD (chronic obstructive pulmonary disease) (HCC)    • Gout    • Hyperlipidemia    • Hypertension    • Sleep apnea        Past surgical History:  Past Surgical History:   Procedure Laterality Date   • CARDIAC CATHETERIZATION  08/2008    normal coronaries    • CHOLECYSTECTOMY     • KNEE ARTHROSCOPY     • OTHER SURGICAL HISTORY Left 08/2018     arm surgery        Social History:  Social History     Socioeconomic History   • Marital status:    Tobacco Use   • Smoking status: Never Smoker   • Smokeless tobacco: Never Used   Vaping Use   • Vaping Use: Never used   Substance and Sexual Activity   • Alcohol use: Yes     Alcohol/week: 1.0 - 2.0 standard drink     Types: 1 - 2 Cans of beer per week   • Drug use: No       Review of Systems:  The following systems were reviewed as they relate to the cardiovascular system: Constitutional, Eyes, ENT, Cardiovascular, Respiratory, Gastrointestinal, Integumentary, Neurological, Psychiatric, Hematologic, Endocrine, Musculoskeletal, and Genitourinary. The pertinent cardiovascular findings are reported above with all other cardiovascular points within those systems being negative.    Diagnostic Study Review:     Current Electrocardiogram:  Procedures      NOTE: The following portions of the patient's history were reviewed and updated this visit as appropriate: allergies, current medications, past family history, past medical history, past social history, past surgical history and problem list.

## 2022-04-13 RX ORDER — HYDROCHLOROTHIAZIDE 12.5 MG/1
TABLET ORAL
Qty: 90 TABLET | Refills: 0 | Status: SHIPPED | OUTPATIENT
Start: 2022-04-13 | End: 2022-07-25

## 2022-04-13 RX ORDER — EZETIMIBE 10 MG/1
TABLET ORAL
Qty: 90 TABLET | Refills: 0 | Status: SHIPPED | OUTPATIENT
Start: 2022-04-13 | End: 2022-07-25

## 2022-06-14 ENCOUNTER — OFFICE VISIT (OUTPATIENT)
Dept: CARDIOLOGY | Facility: CLINIC | Age: 77
End: 2022-06-14

## 2022-06-14 VITALS
BODY MASS INDEX: 33.62 KG/M2 | WEIGHT: 227 LBS | HEIGHT: 69 IN | HEART RATE: 66 BPM | SYSTOLIC BLOOD PRESSURE: 176 MMHG | DIASTOLIC BLOOD PRESSURE: 76 MMHG | OXYGEN SATURATION: 97 %

## 2022-06-14 DIAGNOSIS — I10 PRIMARY HYPERTENSION: Primary | ICD-10-CM

## 2022-06-14 DIAGNOSIS — E78.2 MIXED HYPERLIPIDEMIA: ICD-10-CM

## 2022-06-14 DIAGNOSIS — E78.5 DYSLIPIDEMIA: ICD-10-CM

## 2022-06-14 DIAGNOSIS — J43.9 PULMONARY EMPHYSEMA, UNSPECIFIED EMPHYSEMA TYPE: ICD-10-CM

## 2022-06-14 PROCEDURE — 99214 OFFICE O/P EST MOD 30 MIN: CPT | Performed by: INTERNAL MEDICINE

## 2022-06-14 PROCEDURE — 93000 ELECTROCARDIOGRAM COMPLETE: CPT | Performed by: INTERNAL MEDICINE

## 2022-06-14 NOTE — PROGRESS NOTES
Cardiology Office Visit      Encounter Date:  06/14/2022    Patient ID:   Xu Baeza is a 76 y.o. male.    Reason For Followup:  Hypertension  Hyperlipidemia  Coronary artery disease  Statin intolerance    Brief Clinical History:  Dear Dr. Bonilla, London Broderick MD    I had the pleasure of seeing Xu Baeza today. As you are well aware, this is a 76 y.o. male with history of hypertension and dyslipidemia. He had normal coronary arteries by cardiac catheterization in August of 2008.      Interval History:  Denies any symptoms of chest pain shortness of breath dizziness or syncope  Denies any cardiac symptoms  Denies any dizziness or syncope  No orthopnea no PND    Assessment & Plan    Impressions:  1   hypertension probably whitecoat since it is doing much better at home.  We will continue his hydralazine at 25 mg t.i.d. medications reviewed with the patient.  Continue current dose of metoprolol and hydrochlorothiazide     2- Dyslipidemia.  Lipid profile is as noted above.  start patient on Zetia 10 mg p.o. once a day and recheck lipids in 2 months    3.  Coronary artery disease    4.  Intolerance to statins    Recommendations:    He has myalgias secondary to pravastatin  Patient tried multiple statins and multiple dosing patterns and does not want to take statins anymore  Continue Zetia 10 mg p.o. once a day  Continue Norvasc 5 mg p.o. once a day  Continue hydralazine and hydrochlorothiazide for blood pressure control  Recent vascular screening test that was normal per patient  Recent labs reviewed and discussed with patient  Continue regular exercise and risk factor modification  Continue fish oil supplements  We will see him in the office in 6 months for Follow-up.     Possible use of PCSK9 inhibitor reviewed and discussed with the patient but he likes to wait at this time  Prior work-up and evaluation and labs reviewed and discussed with patient  Thank you very much for allowing us to participate in the  "patients    Objective:    Vitals:  Vitals:    06/14/22 0819   BP: 176/76   Pulse: 66   SpO2: 97%   Weight: 103 kg (227 lb)   Height: 175.3 cm (69\")     Body mass index is 33.52 kg/m².      Physical Exam:    General: Alert, cooperative, no distress, appears stated age  Head:  Normocephalic, atraumatic, mucous membranes moist  Eyes:  Conjunctiva/corneas clear, EOM's intact     Neck:  Supple,  no bruit    Lungs: Clear to auscultation bilaterally, no wheezes rhonchi rales are noted  Chest wall: No tenderness  Heart::  Regular rate and rhythm, S1 and S2 normal, no murmur, rub or gallop  Abdomen: Soft, non-tender, nondistended bowel sounds active  Extremities: No cyanosis, clubbing, or edema  Pulses: 2+ and symmetric all extremities  Skin:  No rashes or lesions  Neuro/psych: A&O x3. CN II through XII are grossly intact with appropriate affect      Allergies:  Allergies   Allergen Reactions   • Penicillins Other (See Comments)     Passed out     • Pravastatin Myalgia       Medication Review:     Current Outpatient Medications:   •  allopurinol (ZYLOPRIM) 300 MG tablet, Take 300 mg by mouth Daily., Disp: , Rfl: 4  •  amLODIPine (NORVASC) 5 MG tablet, Take 1 tablet by mouth Daily., Disp: 90 tablet, Rfl: 3  •  aspirin 81 MG tablet, Daily., Disp: , Rfl:   •  citalopram (CeleXA) 20 MG tablet, Take 20 mg by mouth Daily., Disp: , Rfl:   •  Cod Liver Oil 1000 MG capsule, Take  by mouth., Disp: , Rfl:   •  ezetimibe (ZETIA) 10 MG tablet, Take 1 tablet by mouth once daily, Disp: 90 tablet, Rfl: 0  •  hydrALAZINE (APRESOLINE) 25 MG tablet, Take 1 tablet by mouth 3 (Three) Times a Day., Disp: 270 tablet, Rfl: 3  •  hydroCHLOROthiazide (HYDRODIURIL) 12.5 MG tablet, Take 1 tablet by mouth once daily, Disp: 90 tablet, Rfl: 0  •  indomethacin (INDOCIN) 25 MG capsule, 2 (Two) Times a Day As Needed., Disp: , Rfl:   •  Misc Natural Products (GLUCOSAMINE CHONDROITIN ADV) tablet, 2 (Two) Times a Day., Disp: , Rfl:   •  Omega-3 Fatty Acids " (FISH OIL) 1200 MG capsule capsule, 2 tablets twice daily, Disp: , Rfl:     Family History:  Family History   Problem Relation Age of Onset   • Heart disease Mother    • Hypertension Mother    • Hypertension Brother    • Heart attack Maternal Grandmother    • Diabetes Maternal Grandmother        Past Medical History:  Past Medical History:   Diagnosis Date   • COPD (chronic obstructive pulmonary disease) (HCC)    • Gout    • Hyperlipidemia    • Hypertension    • Sleep apnea        Past Surgical History:  Past Surgical History:   Procedure Laterality Date   • CARDIAC CATHETERIZATION  08/2008    normal coronaries    • CHOLECYSTECTOMY     • KNEE ARTHROSCOPY     • OTHER SURGICAL HISTORY Left 08/2018     arm surgery        Social History:  Social History     Socioeconomic History   • Marital status:    Tobacco Use   • Smoking status: Never Smoker   • Smokeless tobacco: Never Used   Vaping Use   • Vaping Use: Never used   Substance and Sexual Activity   • Alcohol use: Yes     Alcohol/week: 1.0 - 2.0 standard drink     Types: 1 - 2 Cans of beer per week   • Drug use: No       Review of Systems:  The following systems were reviewed as they relate to the cardiovascular system: Constitutional, Eyes, ENT, Cardiovascular, Respiratory, Gastrointestinal, Integumentary, Neurological, Psychiatric, Hematologic, Endocrine, Musculoskeletal, and Genitourinary. The pertinent cardiovascular findings are reported above with all other cardiovascular points within those systems being negative.    Diagnostic Study Review:     Current Electrocardiogram:    ECG 12 Lead    Date/Time: 6/14/2022 9:25 AM  Performed by: Eva Irwin MD  Authorized by: Eva Irwin MD   Comparison: compared with previous ECG   Similar to previous ECG  Rhythm: sinus rhythm  Rate: normal  BPM: 66  Conduction: conduction normal  QRS axis: normal  Other findings: non-specific ST-T wave changes and left ventricular  hypertrophy            Laboratory Data:  No results found for: GLUCOSE, BUN, CREATININE, EGFRIFNONA, EGFRIFAFRI, BCR, K, CO2, CALCIUM, PROTENTOTREF, ALBUMIN, LABIL2, BILIRUBIN, AST, ALT  No results found for: GLUCOSE, CALCIUM, NA, K, CO2, CL, BUN, CREATININE, EGFRIFAFRI, EGFRIFNONA, BCR, ANIONGAP  No results found for: WBC, HGB, HCT, MCV, PLT  Lab Results   Component Value Date    CHOL 172 11/05/2019    TRIG 180 (A) 11/05/2019    HDL 34 (A) 11/05/2019     (A) 11/05/2019     No results found for: HGBA1C  No results found for: INR, PROTIME    Most Recent Echo:       Most Recent Stress Test:       Most Recent Cardiac Catheterization:   No results found for this or any previous visit.       NOTE: The following portions of the patient's note were reviewed, confirmed and/or updated this visit as appropriate: History of present illness/Interval history, physical examination, assessment & plan, allergies, current medications, past family history, past medical history, past social history, past surgical history and problem list.

## 2022-07-25 RX ORDER — EZETIMIBE 10 MG/1
TABLET ORAL
Qty: 90 TABLET | Refills: 0 | Status: SHIPPED | OUTPATIENT
Start: 2022-07-25 | End: 2022-09-21

## 2022-07-25 RX ORDER — HYDROCHLOROTHIAZIDE 12.5 MG/1
TABLET ORAL
Qty: 90 TABLET | Refills: 0 | Status: SHIPPED | OUTPATIENT
Start: 2022-07-25 | End: 2022-09-21

## 2022-09-21 RX ORDER — EZETIMIBE 10 MG/1
TABLET ORAL
Qty: 90 TABLET | Refills: 0 | Status: SHIPPED | OUTPATIENT
Start: 2022-09-21 | End: 2022-12-13 | Stop reason: SDUPTHER

## 2022-09-21 RX ORDER — HYDROCHLOROTHIAZIDE 12.5 MG/1
TABLET ORAL
Qty: 90 TABLET | Refills: 0 | Status: SHIPPED | OUTPATIENT
Start: 2022-09-21 | End: 2022-12-13 | Stop reason: SDUPTHER

## 2022-12-13 ENCOUNTER — TELEPHONE (OUTPATIENT)
Dept: CARDIOLOGY | Facility: CLINIC | Age: 77
End: 2022-12-13

## 2022-12-13 ENCOUNTER — OFFICE VISIT (OUTPATIENT)
Dept: CARDIOLOGY | Facility: CLINIC | Age: 77
End: 2022-12-13

## 2022-12-13 VITALS
WEIGHT: 235 LBS | BODY MASS INDEX: 34.8 KG/M2 | HEART RATE: 76 BPM | OXYGEN SATURATION: 98 % | SYSTOLIC BLOOD PRESSURE: 168 MMHG | HEIGHT: 69 IN | DIASTOLIC BLOOD PRESSURE: 74 MMHG

## 2022-12-13 DIAGNOSIS — E78.2 MIXED HYPERLIPIDEMIA: Primary | ICD-10-CM

## 2022-12-13 DIAGNOSIS — I10 ESSENTIAL HYPERTENSION: ICD-10-CM

## 2022-12-13 DIAGNOSIS — I10 PRIMARY HYPERTENSION: ICD-10-CM

## 2022-12-13 DIAGNOSIS — E78.5 DYSLIPIDEMIA: ICD-10-CM

## 2022-12-13 PROCEDURE — 93000 ELECTROCARDIOGRAM COMPLETE: CPT | Performed by: INTERNAL MEDICINE

## 2022-12-13 PROCEDURE — 99214 OFFICE O/P EST MOD 30 MIN: CPT | Performed by: INTERNAL MEDICINE

## 2022-12-13 RX ORDER — AMLODIPINE BESYLATE 5 MG/1
5 TABLET ORAL DAILY
Qty: 90 TABLET | Refills: 3 | Status: SHIPPED | OUTPATIENT
Start: 2022-12-13

## 2022-12-13 RX ORDER — HYDROCHLOROTHIAZIDE 12.5 MG/1
12.5 TABLET ORAL DAILY
Qty: 90 TABLET | Refills: 3 | Status: SHIPPED | OUTPATIENT
Start: 2022-12-13

## 2022-12-13 RX ORDER — EZETIMIBE 10 MG/1
10 TABLET ORAL DAILY
Qty: 90 TABLET | Refills: 3 | Status: SHIPPED | OUTPATIENT
Start: 2022-12-13

## 2022-12-13 RX ORDER — HYDRALAZINE HYDROCHLORIDE 50 MG/1
50 TABLET, FILM COATED ORAL 2 TIMES DAILY
Qty: 270 TABLET | Refills: 3 | Status: SHIPPED | OUTPATIENT
Start: 2022-12-13

## 2022-12-13 NOTE — TELEPHONE ENCOUNTER
I have submitted the PA request. We will look into patient assistance once we have a response to the PA.

## 2022-12-13 NOTE — PROGRESS NOTES
Cardiology Office Visit      Encounter Date:  12/13/2022    Patient ID:   Xu Baeza is a 77 y.o. male.    Reason For Followup:  Hypertension  Hyperlipidemia  Coronary artery disease  Statin intolerance    Brief Clinical History:  Dear Dr. Bonilla, London Broderick MD    I had the pleasure of seeing Xu Baeza today. As you are well aware, this is a 76 y.o. male with history of hypertension and dyslipidemia. He had normal coronary arteries by cardiac catheterization in August of 2008.      Interval History:  Denies any symptoms of chest pain shortness of breath dizziness or syncope  Denies any cardiac symptoms  Denies any dizziness or syncope  No orthopnea no PND    Assessment & Plan    Impressions:  1   hypertension probably whitecoat since it is doing much better at home.  We will continue his hydralazine at 25 mg t.i.d. medications reviewed with the patient.  Continue current dose of metoprolol and hydrochlorothiazide     2- Dyslipidemia.  Lipid profile is as noted above.  start patient on Zetia 10 mg p.o. once a day and recheck lipids in 2 months    3.  Coronary artery disease    4.  Intolerance to statins    Recommendations:    He has myalgias secondary to pravastatin  Patient tried multiple statins and multiple dosing patterns and does not want to take statins anymore  Continue Zetia 10 mg p.o. once a day  Continue Norvasc 5 mg p.o. once a day  Continue hydralazine and hydrochlorothiazide for blood pressure control  Recent vascular screening test that was normal per patient  Recent labs reviewed and discussed with patient  Continue regular exercise and risk factor modification  Continue fish oil supplements  We will see him in the office in 6 months for Follow-up.     Possible use of PCSK9 inhibitor reviewed and discussed with the patient but he likes to wait at this time  Prior work-up and evaluation and labs reviewed and discussed with patient   Check labs including CBC CMP and lipid  Continue  "aggressive risk factor modification  Thank you very much for allowing us to participate in the patients    Objective:    Vitals:  Vitals:    12/13/22 0802   BP: 168/74   Pulse: 76   SpO2: 98%   Weight: 107 kg (235 lb)   Height: 175.3 cm (69\")       Physical Exam:    General: Alert, cooperative, no distress, appears stated age  Head:  Normocephalic, atraumatic, mucous membranes moist  Eyes:  Conjunctiva/corneas clear, EOM's intact     Neck:  Supple,  no adenopathy;      Lungs: Clear to auscultation bilaterally, no wheezes rhonchi rales are noted  Chest wall: No tenderness  Heart::  Regular rate and rhythm, S1 and S2 normal, no murmur, rub or gallop  Abdomen: Soft, non-tender, nondistended bowel sounds active  Extremities: No cyanosis, clubbing, or edema  Pulses: 2+ and symmetric all extremities  Skin:  No rashes or lesions  Neuro/psych: A&O x3. CN II through XII are grossly intact with appropriate affect      Allergies:  Allergies   Allergen Reactions   • Penicillins Other (See Comments)     Passed out     • Pravastatin Myalgia       Medication Review:     Current Outpatient Medications:   •  allopurinol (ZYLOPRIM) 300 MG tablet, Take 300 mg by mouth Daily., Disp: , Rfl: 4  •  amLODIPine (NORVASC) 5 MG tablet, Take 1 tablet by mouth Daily., Disp: 90 tablet, Rfl: 3  •  aspirin 81 MG tablet, Daily., Disp: , Rfl:   •  citalopram (CeleXA) 20 MG tablet, Take 20 mg by mouth Daily., Disp: , Rfl:   •  Cod Liver Oil 1000 MG capsule, Take  by mouth., Disp: , Rfl:   •  ezetimibe (ZETIA) 10 MG tablet, Take 1 tablet by mouth Daily., Disp: 90 tablet, Rfl: 3  •  hydroCHLOROthiazide (HYDRODIURIL) 12.5 MG tablet, Take 1 tablet by mouth Daily., Disp: 90 tablet, Rfl: 3  •  indomethacin (INDOCIN) 25 MG capsule, 2 (Two) Times a Day As Needed., Disp: , Rfl:   •  Omega-3 Fatty Acids (FISH OIL) 1200 MG capsule capsule, 2 tablets twice daily, Disp: , Rfl:   •  Evolocumab (REPATHA) solution auto-injector SureClick injection, Inject 1 mL under " the skin into the appropriate area as directed Every 14 (Fourteen) Days., Disp: 1 mL, Rfl: 4  •  hydrALAZINE (APRESOLINE) 50 MG tablet, Take 1 tablet by mouth 2 (Two) Times a Day., Disp: 270 tablet, Rfl: 3    Family History:  Family History   Problem Relation Age of Onset   • Heart disease Mother    • Hypertension Mother    • Hypertension Brother    • Heart attack Maternal Grandmother    • Diabetes Maternal Grandmother        Past Medical History:  Past Medical History:   Diagnosis Date   • COPD (chronic obstructive pulmonary disease) (HCC)    • Gout    • Hyperlipidemia    • Hypertension    • Sleep apnea        Past surgical History:  Past Surgical History:   Procedure Laterality Date   • CARDIAC CATHETERIZATION  08/2008    normal coronaries    • CHOLECYSTECTOMY     • KNEE ARTHROSCOPY     • OTHER SURGICAL HISTORY Left 08/2018     arm surgery        Social History:  Social History     Socioeconomic History   • Marital status:    Tobacco Use   • Smoking status: Never   • Smokeless tobacco: Never   Vaping Use   • Vaping Use: Never used   Substance and Sexual Activity   • Alcohol use: Yes     Alcohol/week: 1.0 - 2.0 standard drink     Types: 1 - 2 Cans of beer per week     Comment: occ   • Drug use: No       Review of Systems:  The following systems were reviewed as they relate to the cardiovascular system: Constitutional, Eyes, ENT, Cardiovascular, Respiratory, Gastrointestinal, Integumentary, Neurological, Psychiatric, Hematologic, Endocrine, Musculoskeletal, and Genitourinary. The pertinent cardiovascular findings are reported above with all other cardiovascular points within those systems being negative.    Diagnostic Study Review:     Current Electrocardiogram:    ECG 12 Lead    Date/Time: 12/13/2022 5:12 PM  Performed by: Eva Irwin MD  Authorized by: Eva Irwin MD   Comparison: compared with previous ECG   Similar to previous ECG  Rhythm: sinus rhythm  Rate: normal  BPM:  74  Conduction: conduction normal  QRS axis: normal  Other findings: non-specific ST-T wave changes    Clinical impression: abnormal EKG              NOTE: The following portions of the patient's history were reviewed and updated this visit as appropriate: allergies, current medications, past family history, past medical history, past social history, past surgical history and problem list.

## 2022-12-13 NOTE — TELEPHONE ENCOUNTER
Caller: Xu Baeza    Relationship: Self    Best call back number: [phone]    What was the call regarding: PT CALLED IN TO NOTIFY  THAT HE REACHED OUT TO [employer] ABOUT HIS REPATHA COVERAGE. [employer] STATES THAT IT MAY TAKE 2 WEEKS FOR A RESPONSE OF APPROVAL/DENIAL. THEY ALSO TOLD HIM THAT HIS COPAY MAY BE VERY HIGH AND THE PT STATES THAT A HIGH COPAY IS NOT FEASIBLE FOR HIM.    Do you require a callback: IF NECESSARY

## 2022-12-16 ENCOUNTER — TELEPHONE (OUTPATIENT)
Dept: CARDIOLOGY | Facility: CLINIC | Age: 77
End: 2022-12-16

## 2022-12-16 NOTE — TELEPHONE ENCOUNTER
Called and notified, patient verbalized understanding    ----------------------------    Eva Irwin MD [place], MA  Phone Number: [phone]     He can check and make sure it is not because of the donut hole   If the co-pay is any better from last month he can start the medication next month   If not Continue only Zetia for now   Hopefully [employer] might have a way to give the newer medication that can be done once in 6 months (that can be administered in the office and there is no co-pay involved)           Previous Messages     ----- Message -----   From:  CANDACE Owens   Sent: 12/14/2022   2:26 PM EST   To: Eva Irwin MD   Subject: repatha                                           Patient called and states the repatha is 400.00 a month with Insurance. He wants to know what else can he take.     Please advise

## 2023-05-01 RX ORDER — EZETIMIBE 10 MG/1
TABLET ORAL
Qty: 90 TABLET | Refills: 2 | Status: SHIPPED | OUTPATIENT
Start: 2023-05-01

## 2023-05-01 RX ORDER — HYDROCHLOROTHIAZIDE 12.5 MG/1
TABLET ORAL
Qty: 90 TABLET | Refills: 2 | Status: SHIPPED | OUTPATIENT
Start: 2023-05-01

## 2024-02-05 RX ORDER — HYDRALAZINE HYDROCHLORIDE 50 MG/1
50 TABLET, FILM COATED ORAL 2 TIMES DAILY
Qty: 180 TABLET | Refills: 0 | OUTPATIENT
Start: 2024-02-05